# Patient Record
Sex: MALE | Race: WHITE
[De-identification: names, ages, dates, MRNs, and addresses within clinical notes are randomized per-mention and may not be internally consistent; named-entity substitution may affect disease eponyms.]

---

## 2022-06-17 ENCOUNTER — HOSPITAL ENCOUNTER (OUTPATIENT)
Dept: HOSPITAL 46 - DS | Age: 72
Discharge: HOME | End: 2022-06-17
Attending: SPECIALIST
Payer: MEDICARE

## 2022-06-17 VITALS — BODY MASS INDEX: 35.57 KG/M2 | WEIGHT: 221.34 LBS | HEIGHT: 66 IN

## 2022-06-17 DIAGNOSIS — Z87.891: ICD-10-CM

## 2022-06-17 DIAGNOSIS — M22.42: ICD-10-CM

## 2022-06-17 DIAGNOSIS — M23.222: Primary | ICD-10-CM

## 2022-06-17 DIAGNOSIS — Z20.822: ICD-10-CM

## 2022-06-17 DIAGNOSIS — M25.762: ICD-10-CM

## 2022-06-17 PROCEDURE — 0SBD4ZZ EXCISION OF LEFT KNEE JOINT, PERCUTANEOUS ENDOSCOPIC APPROACH: ICD-10-PCS | Performed by: SPECIALIST

## 2022-06-17 PROCEDURE — C9803 HOPD COVID-19 SPEC COLLECT: HCPCS

## 2022-06-17 PROCEDURE — U0003 INFECTIOUS AGENT DETECTION BY NUCLEIC ACID (DNA OR RNA); SEVERE ACUTE RESPIRATORY SYNDROME CORONAVIRUS 2 (SARS-COV-2) (CORONAVIRUS DISEASE [COVID-19]), AMPLIFIED PROBE TECHNIQUE, MAKING USE OF HIGH THROUGHPUT TECHNOLOGIES AS DESCRIBED BY CMS-2020-01-R: HCPCS

## 2022-06-17 NOTE — NUR
1020-PATIENT BACK TO ROOM FROM PACU ON RA. RECEIVED REPORT FROM FIDENCIO DRAKE.
PATIENT IS AWAKE. RESP EVEN AND UNLABORED. VSS. DENIES PAIN AND NAUSEA.
DRESSING IS CLEAN, DRY, AND INTACT. ICE PACK IN PLACE. BED RAILS UP. WIFE AT
BED SIDE. PATIENT TAKING SIPS OF WATER. CALL LIGHT WITHIN REACH.

## 2022-06-17 NOTE — NUR
PT ALERT, ORIENTED AND SUPPORTED BY HIS WIFE LUIS. PT IS PLEASANT, ALL
QUESTIONS ASKED ANSWERED. PT DID REQUEST PRAYER, LUIS WILL LEAVE AND COME
BACK FOR DC. WILL FOLLOW

## 2022-06-17 NOTE — NUR
06/17/22 0949 Linda Fraser
0940- PT ARRIVES TO PACU NONAROUSABLE TO STIMULI WITH AN OPA IN PLACE.
RESP EVEN AND UNLABORED. OXYGEN SAT HIGH 90'S % ON 6L VIA MASK.
PT SNORING ON ARRIVAL, HEAD REPOSITIONED AND SNORING CLEARED.

## 2022-06-20 NOTE — OR
[facility]
                                    [address], [place]  [address]
_________________________________________________________________________________________
                                                                 Signed   
 
 
DATE OF OPERATION:
06/17/2022
 
SURGEON:
Jeffry Kenney MD
 
PREOPERATIVE DIAGNOSIS:
Medial meniscal tear, left knee.
 
POSTOPERATIVE DIAGNOSIS:
Medial meniscal tear, left knee.
 
PROCEDURE PERFORMED:
Left knee arthroscopy with partial medial meniscectomy.
 
ASSISTANT:
None.
 
ANESTHESIA:
General.
 
BLOOD LOSS:
Minimal.
 
TOURNIQUET TIME:
Zero.
 
BRIEF HISTORY:
Luis Alberto is a 72-year-old gentleman with pain and locking in his knee.  MRI was consistent
with a large posteromedial meniscus tear of degenerative nature.  Risks and benefits of
operative treatment were discussed with him.  He elected to proceed.  Once consent was
obtained, he was taken to the operating room.  After adequate anesthesia, he was placed
on operating room table.  All downside pressure points well padded.  The right leg was
flexed, abducted and externally rotated on a well-padded leg sheets.  The left was
placed in well-padded leg sheets and prepped and draped in a standard sterile fashion.
The portal sites were injected with 0.25% Marcaine with epinephrine.  Standard inferior
lateral and superolateral portals were made.  The scope was introduced. 
 
ARTHROSCOPIC FINDINGS:
Moderate to significant synovitis was noted throughout the knee.  There was grade 2
chondromalacia to the patella, grade 3 to the trochlea, particularly the central
portion.  Medial and lateral gutters were clear with small osteophytes.  ACL and PCL
 
    Electronically Signed By: JEFFRY KENNEY MD  06/20/22 0635
_________________________________________________________________________________________
PATIENT NAME:     LUIS ALBERTO FELIX                  
MEDICAL RECORD #: [id number]            OPERATIVE REPORT              
          ACCT #: [id number]  
DATE OF BIRTH:   [birth year]            REPORT #: [id number]      
PHYSICIAN:        JEFFRY KENNEY MD              
PCP:              MICHAEL KWON MD      
REPORT IS CONFIDENTIAL AND NOT TO BE RELEASED WITHOUT AUTHORIZATION
 
 
                                  [facility]
                                    [address], [place]  [address]
_________________________________________________________________________________________
                                                                 Signed   
 
 
were intact.  Lateral compartment was intact.  There was some minor fraying of the
meniscus.  The medial compartment showed degenerative meniscus tear from the posterior
medial corner extending posteriorly with horizontal radial and macerated components.
There was grade 3 chondromalacia to the posterior 2/3rd of the femur of the femoral
condyle and grade 2 changes on tibial side. 
 
DESCRIPTION OF OPERATION:
Standard inferomedial portal was made after localization using the spinal needle.  The
straight and curved biters were used to trim the meniscus tear back to a stable rim and
all debris was evacuated using the shaver.  The shaver was then used to smooth the
contours of the meniscus and feathered it out anteriorly.  Several chondral flaps on the
femur were also taken off.  The scope was withdrawn.  Portals were closed with 3-0
nylon.  The knee was injected with 60 mg Toradol at the end of the case.  The wounds
were dressed with Adaptic, ABD, and Ace wrap.  He tolerated the procedure well.  All
sponge, needle, and instrument counts were correct. 
 
 
 
            ________________________________________
            Jeffry Kenney MD 
 
 
BA/MODL
Job #:  [id number]
DD:  06/17/2022 09:41:05
DT:  06/17/2022 10:35:11
 
 
Copies:                                
~
 
 
 
 
 
 
 
 
 
 
 
 
 
    Electronically Signed By: JEFFRY KENNEY MD  06/20/22 0635
_________________________________________________________________________________________
PATIENT NAME:     LUIS ALBERTO FELIX                  
MEDICAL RECORD #: [id number]            OPERATIVE REPORT              
          ACCT #: [id number]  
DATE OF BIRTH:   [birth year]            REPORT #: [id number]      
PHYSICIAN:        JEFFRY KENNEY MD              
PCP:              MICHAEL KWON MD      
REPORT IS CONFIDENTIAL AND NOT TO BE RELEASED WITHOUT AUTHORIZATION

## 2023-01-16 NOTE — NUR
LAB TO BE OBTAIN[facility], PROVIDED COPY OF ORDER TO PATIENT AND
FAXED ORDER OF LAB[facility] TO [facility],

## 2023-01-17 ENCOUNTER — HOSPITAL ENCOUNTER (OUTPATIENT)
Dept: HOSPITAL 46 - DS | Age: 73
Discharge: HOME | End: 2023-01-17
Attending: OTOLARYNGOLOGY
Payer: MEDICARE

## 2023-01-17 VITALS — WEIGHT: 230.38 LBS | BODY MASS INDEX: 37.03 KG/M2 | HEIGHT: 66 IN

## 2023-01-17 DIAGNOSIS — H65.92: Primary | ICD-10-CM

## 2023-01-17 PROCEDURE — 099600Z DRAINAGE OF LEFT MIDDLE EAR WITH DRAINAGE DEVICE, OPEN APPROACH: ICD-10-PCS | Performed by: OTOLARYNGOLOGY

## 2023-01-17 NOTE — NUR
01/17/23 Edison6 Mala Moody
1022 PATIENT ARRIVES TO PACU AWAKE ASKING/ANSWERING QUESTIONS
APPROPRIATELY. RESP EVEN AND UNLABORED, MASK AT 6 LITERS.
1025 PATIENT AWAKE, DENIES PAIN OR NAUSEA. OXYGEN MASK OFF. DENIES
NEEDS. RESP EVEN AND UNLABORED, ROOM AIR SATS >96%.

## 2023-01-17 NOTE — NUR
LE 1145 PATIENT HAS MET DISCHARGE CRITERIA. PATIENT IV D/C'D WNL. PATIENT
GIVEN DISCHARGE INSTRUCTIONS AND UNDERSTOOD. NO QUESTIONS AT THIS TIME.
PATIENT WHEELED OUT OF FACILITY TO PRIVATE AUTO. NO FUTHER NEEDS.

## 2023-01-17 NOTE — OR
[facility]
                                    [address], [place]  [address]
_________________________________________________________________________________________
                                                                 Draft    
 
 
DATE OF OPERATION:
01/17/2023
 
SURGEON:
Inder Sofia MD
 
PREOPERATIVE DIAGNOSIS:
Left recurrent serous otitis media with conductive hearing loss.
 
POSTOPERATIVE DIAGNOSIS:
Left recurrent serous otitis media with conductive hearing loss.
 
PROCEDURE:
Left myringotomy and ventilation tube insertion with a T-tube.
 
ANESTHESIA:
General LMA, CRNA, Kahlil.
 
PREOPERATIVE HISTORY:
Mr. Felix is a 72-year-old man with a long history of left ear middle ear effusions.
He was treated with a Willis tube a year or so ago with resolution of his symptoms,
but the tube has extruded.  He has had recurrent effusion, hearing loss and flat
tympanogram. He taken to the operating for the above-mentioned procedure. 
 
OPERATIVE PROCEDURE AND FINDINGS:
After informed consent, the patient was taken to the operating room, placed in a supine
position where general LMA anesthesia was induced. The patient and procedure were
verified.  The left ear was examined with the operating microscope.  The extruded
Willis tube in the ear canal was removed.  The eardrum was dull, retracted with the
middle ear effusion.  Inferior radial myringotomy was made.  Serous effusion suctioned
from middle ear space.  A T-tube placed in the myringotomy site.  Cipro ophthalmic
solution applied to the ear canal, cotton ball the meatus.  The patient tolerated the
procedure well, was awakened, extubated, and transported to recovery room in good
condition.  No complications. 
 
BLOOD LOSS:
Minimal.
 
SPECIMENS:
No specimens.
 
DRAINS:
 
                                                                                    
_________________________________________________________________________________________
PATIENT NAME:     LUIS ALBERTO FELIX                  
MEDICAL RECORD #: [id number]            OPERATIVE REPORT              
          ACCT #: [id number]  
DATE OF BIRTH:   [birth year]            REPORT #: [id number]      
PHYSICIAN:        INDER SOFIA MD              
PCP:              MICHAEL KWON MD               
REPORT IS CONFIDENTIAL AND NOT TO BE RELEASED WITHOUT AUTHORIZATION
 
 
                                  [address]
                                  [place], [place]  [address]
_________________________________________________________________________________________
                                                                 [facility].
 
 
 
            ________________________________________
            Inder Sofia MD 
 
 
/BETHANY
Job #:  [id number]
DD:  01/17/2023 10:25:43
DT:  01/17/2023 11:54:53
 
 
Copies:                                
~
 
 
 
 
 
 
 
 
 
 
 
 
 
 
 
 
 
 
 
 
 
 
 
 
 
 
 
                                                                                    
_________________________________________________________________________________________
PATIENT NAME:     LUIS ALBERTO FELIX                  
MEDICAL RECORD #: [id number]            OPERATIVE REPORT              
          ACCT #: [id number]  
DATE OF BIRTH:   [birth year]            REPORT #: [id number]      
PHYSICIAN:        INDER SOFIA MD              
PCP:              MICHAEL KWNO MD               
REPORT IS CONFIDENTIAL AND NOT TO BE RELEASED WITHOUT AUTHORIZATION

## 2025-07-11 NOTE — NUR
[facility] Note    Name: Terence Anton    Date: 7/11/2025     Terence Anton is a 40 year old male who presents for evaluation of T1DM management.     Subjective:    Initial HPI consult in April 2024  Chief complaint: Management of T1DM        DM hx:  -Diagnosed with diabetes in 2018, initially managed as T2 and then diagnosed with T1 around 2020  -Family history- yes, daughter with T1 (at age 4)    -Re: potential DM medication contraindication: denies history of pancreatitis, personal/fam hx of medullary thyroid ca/MEN2, UTI/yeast infxn, gastroparesis  -Presence of associated DM complications (if positive, checkbox selected):  [] Macrovascular complications (CAD/CVA/PAD)  [] Neuropathy  [] Retinopathy  [] Nephropathy  [] HTN  [x] Hyperlipidemia  -Lifestyle: active, making dietary changes     DM meds at first office visit:Metformin 500 mg daily, Tresiba 20 u at bedtime, Novolog 10-15u TID     Interval Hx 7/11/2025   With Shani CASTANEDA: Last A1c value was 8.5% done 7/8/2025.  Blood glucose remain elevated.  7/8/25- was in ER- had L flank pain (history of kidney stones), felt better after toradol/fluids/flomax, thinks he passed it, pain has improved      Diabetes: In the morning, things are more stable, more routine with the kids - easier to manage. Higher blood glucose in the evening  Diet recall:   AM: tea, mini croissant- 27g of carb - takes 5-6u (which also covers 2nd cup of tea)  Lunch: sometimes eat, sometimes skips  Dinner: eats around 6pm with the family  Evening: kids go to bed- snacking more often     Went to pump information session with his daughter- she's going to stay off of pump.   He is not interested in pump use unless it were to be to support his daughter with T1D   Has found it helpful to log insulin doses in dexcom    DM meds at office visit:   Diabetic Medications              MOUNJARO 2.5 MG/0.5ML Subcutaneous Solution Auto-injector INJECT 2.5 MG SUBCUTANEOUSLY WEEKLY        insulin  PATIENT ASSESSMENT COMPLETE. PATIENT IS ALERT AND ORIENTED. BREATHING EQUAL
AND UNLABORED. OXYGEN SATURATIONS ARE ABOVE 95%. PATIENT IS ABLE TO BEAR
WEIGHT AND WAS ABLE TO AMBULATE TO RESTROOM. VOIDED YELLOW AND CLEAR URINE.
PATIENT WAS ABLE TO TOLERATE AMBULATION WELL. PATIENT WAS ABLE TO DRESS SELF.
DISCHARGE INSTRUCTION GIVEN AND UNDERSTOOD. PATIENT IV D/C'D. PATIENT WAS
WHEELED OUT OF FACILITY WITH WIFE TO PRIVATE AUTO. degludec (TRESIBA FLEXTOUCH) 100 UNIT/ML Subcutaneous Solution Pen-injector Inject 20 units daily.    Taking consistently        metFORMIN 500 MG Oral Tab Take 1 tablet (500 mg total) by mouth 2 (two) times daily.    Not consistent on, takes when he remembers         insulin aspart (NOVOLOG FLEXPEN) 100 Units/mL Subcutaneous Solution Pen-injector USING UP TO 50 UNITS DAILY AS DIRECTED IN DIVIDED DOSES AT MEALS      ICR 1:8, ISF 1u:20>140          glucagon (GVOKE HYPOPEN 2-PACK) 1 MG/0.2ML Subcutaneous Solution Auto-injector Inject 1 mg into the skin as needed.              Continuous Glucose Monitoring Interpretation  Terence Anton has undergone continuous glucose monitoring.  The blood glucose tracings were evaluated for two weeks prior to office visit. Blood glucose tracings demonstrated areas of hyperglycemia post-meal, particularly post-dinner. There were occasional hypoglycemia, particularly after an insulin dose of 14-15u - causes a rapid drop, during the weeks of evaluation.        Example of large delayed bolus, followed by drop:         History/Other:    Allergies, PMH, SocHx and FHx reviewed and updated as appropriate in Epic on    insulin degludec (TRESIBA FLEXTOUCH) 100 UNIT/ML Subcutaneous Solution Pen-injector Inject 22 units daily.       No Known Allergies  Current Outpatient Medications   Medication Sig Dispense Refill    insulin degludec (TRESIBA FLEXTOUCH) 100 UNIT/ML Subcutaneous Solution Pen-injector Inject 22 units daily.      MOUNJARO 2.5 MG/0.5ML Subcutaneous Solution Auto-injector INJECT 2.5 MG SUBCUTANEOUSLY WEEKLY 2 mL 0    tamsulosin (FLOMAX) 0.4 MG Oral Cap Take 1 capsule (0.4 mg total) by mouth daily for 7 days. 7 capsule 0    ondansetron 4 MG Oral Tablet Dispersible Take 1 tablet (4 mg total) by mouth every 6 (six) hours as needed. 20 tablet 0    Ketorolac Tromethamine 10 MG Oral Tab Take 1 tablet (10 mg total) by mouth every 6 (six) hours as needed for Pain. 30 tablet 0     HYDROcodone-acetaminophen 5-325 MG Oral Tab Take 1 tablet by mouth every 6 (six) hours as needed for Pain. 20 tablet 0    ATORVASTATIN 20 MG Oral Tab TAKE 1 TABLET BY MOUTH EVERY DAY AT NIGHT 90 tablet 3    metFORMIN 500 MG Oral Tab Take 1 tablet (500 mg total) by mouth 2 (two) times daily. 180 tablet 1    insulin aspart (NOVOLOG FLEXPEN) 100 Units/mL Subcutaneous Solution Pen-injector USING UP TO 50 UNITS DAILY AS DIRECTED IN DIVIDED DOSES AT MEALS 50 mL 1    Continuous Glucose Sensor (DEXCOM G6 SENSOR) Does not apply Misc 1 each Every 10 days. 9 each 2    Clindamycin Phosphate 1 % External Gel APPLY TO AFFECTED AREA TWICE A DAY APPLY TO THE GROIN TWICE A DAY.      doxycycline 100 MG Oral Cap Take 1 capsule (100 mg total) by mouth 2 (two) times daily with meals.      Continuous Glucose Transmitter (DEXCOM G6 TRANSMITTER) Does not apply Misc ONE TRANSMITTER EVERY 90 DAYS, USE WITH DEXCOM G6 SENSOR, E10.65 1 each 3    BD PEN NEEDLE MAURICE 2ND GEN 32G X 4 MM Does not apply Misc USE 5 TIMES DAILY 500 each 3    ACCU-CHEK GUIDE In Vitro Strip EVERY  strip 1    Accu-Chek FastClix Lancets Does not apply Misc Test 3 x daily 1 each 1    glucagon (GVOKE HYPOPEN 2-PACK) 1 MG/0.2ML Subcutaneous Solution Auto-injector Inject 1 mg into the skin as needed. 0.4 mL 1    Blood Glucose Monitoring Suppl (ACCU-CHEK GUIDE) w/Device Does not apply Kit TEST ONCE DAILY AS DIRECTED       Past Medical History:    Calculus of kidney    High cholesterol    Hypothyroidism    Type 1 diabetes mellitus (HCC)     Past Surgical History:   Procedure Laterality Date    Other surgical history  09/14/2017    eswl Dr Wilson     Other surgical history  09/18/2017    cysto, stent removal dr wilson     Removal of kidney stone Left 10/22/2021     Social History     Socioeconomic History    Marital status:    Tobacco Use    Smoking status: Former     Current packs/day: 0.00     Average packs/day: 0.5 packs/day for 12.0 years (6.0 ttl pk-yrs)      Types: Cigarettes     Start date: 6/15/2003     Quit date: 6/15/2015     Years since quitting: 10.0    Smokeless tobacco: Never   Vaping Use    Vaping status: Never Used   Substance and Sexual Activity    Alcohol use: No     Alcohol/week: 0.0 standard drinks of alcohol    Drug use: No   Other Topics Concern    Caffeine Concern Yes     Comment: tea daily    Exercise No    Seat Belt Yes     Social Drivers of Health      Received from [facility]    Housing Stability     Family History   Problem Relation Age of Onset    Diabetes Mother     Other (pre diabetse) Mother     Diabetes Daughter 4        type 1        ROS/Exam    REVIEW OF SYSTEMS: Ten point review of systems has been performed and is otherwise negative and/or non-contributory, except as described above.     VITALS  There were no vitals filed for this visit.        Wt Readings from Last 6 Encounters:   07/08/25 185 lb (83.9 kg)   04/03/25 187 lb (84.8 kg)   09/19/24 204 lb 12.8 oz (92.9 kg)   08/14/24 207 lb (93.9 kg)   04/22/24 205 lb (93 kg)   03/12/24 207 lb 12.8 oz (94.3 kg)     PHYSICAL EXAM  Limited due to telemedicine encounter    Constitutional Not in acute distress  Pulmonary: no wheezing heard  No coughing on the phone. Speaking in full sentences   Neurological:  Alert and oriented to person, place and time.   Psychiatric: Normal affect, mood and behavior appropriate       Labs/Imaging: Pertinent imaging reviewed.    Overall glucose control:  Lab Results   Component Value Date    A1C 8.5 (H) 07/08/2025    A1C 8.4 (A) 04/03/2025    A1C 8.9 (H) 12/27/2024    A1C 8.6 (A) 09/19/2024    A1C 8.4 (A) 06/28/2024       Assessment & Plan:     ICD-10-CM    1. Type 1 diabetes mellitus with hyperglycemia (HCC)  E10.65 insulin degludec (TRESIBA FLEXTOUCH) 100 UNIT/ML Subcutaneous Solution Pen-injector     Microalb/Creat Ratio, Random Urine [E]     Hemoglobin A1C [E]      2. Overweight with body mass index (BMI) of 27 to 27.9 in adult  E66.3      Z68.27       3. Mixed hyperlipidemia  E78.2             Terence Anton is a pleasant 40 year old male here for evaluation of:    #Type 1 Diabetes- PMHx of Type 1 diabetes mellitus diagnosed in 2018. GAD65+. Historically managed on MDI with A1C in 8% range.      Blood glucose still not to goal on current plan. Discussed importance of prebolus behaviors. Using 28-30u of rapid acting insulin- will increase tresiba dose to eval if better balance in blood glucose.     Last A1c value was 8.5% done 7/8/2025. Goal <7%.  Importance of better glucose control in preventing onset/progression of end-organ damage discussed, as well as goals of therapy and clinical significance of A1C.      Med changes:  - continue humalog ICR 8, ISF 1:20>140 three times daily AC  - Tresiba 20u daily --> 22u daily, if FBG still not <130, increase to tresiba 24u/day  - Continue mounjaro 2.5mg weekly    - Continue Metformin 500 mg BID    - continue Dexcom G6 CGM with phone (connected to clinic profile)   - Would benefit from GLP1a therapy as he has signs of insulin resistance causing hyperglycemia, sees benefits from metformin use and has hx of hyperlipidemia. BMI is in overweight category    -Surveillance for Diabetes Complications & Risks  Foot exam/neuropathy: No data recorded    Retinopathy screening: Last Dilated Eye Exam: 08/16/24  Eye Exam shows Diabetic Retinopathy?: No  - due    Nephropathy screening:   - stable  Lab Results   Component Value Date    EGFRCR 104 07/08/2025    MICROALBCREA  01/14/2019      Comment:        Unable to calculate due to Urine Microalbumin <0.5 mg/dL        Blood pressure control:   - stable, no antihypertensives  BP Readings from Last 1 Encounters:   07/08/25 130/77   BP Meds:      #Hyperlipidemia  - LDL not to goal- doesn't consistently take atorva, encouraged compliance  - repeat next office visit   Lab Results   Component Value Date     (H) 12/27/2024    TRIG 75 12/27/2024   Cholesterol Meds:  atorvastatin Tabs - 20 MG        Return in about 3 months (around 10/11/2025) for diabetes follow up.     LEONEL Hernandez   7/11/2025       In reviewing this note, please be advised that Dragon Voice Recognition software used to dictate the note may have made errors in recognizing some of the words or phrases.     Note to patient: The 21 Century Cures Act makes medical notes like these available to patients in the interest of transparency. However, be advised this is a medical document. It is intended as peer to peer communication. It is written in medical language and may contain abbreviations or verbiage that are unfamiliar. It may appear blunt or direct. Medical documents are intended to carry relevant information, facts as evident, and the clinical opinion of the practitioner.